# Patient Record
Sex: MALE | Race: WHITE
[De-identification: names, ages, dates, MRNs, and addresses within clinical notes are randomized per-mention and may not be internally consistent; named-entity substitution may affect disease eponyms.]

---

## 2017-01-03 ENCOUNTER — HOSPITAL ENCOUNTER (OUTPATIENT)
Dept: HOSPITAL 19 - COL.RAD | Age: 57
End: 2017-01-03
Attending: UROLOGY
Payer: COMMERCIAL

## 2017-01-03 DIAGNOSIS — Z90.5: ICD-10-CM

## 2017-01-03 DIAGNOSIS — N40.0: ICD-10-CM

## 2017-01-03 DIAGNOSIS — Z85.520: Primary | ICD-10-CM

## 2017-01-03 DIAGNOSIS — R39.198: ICD-10-CM

## 2017-06-28 ENCOUNTER — HOSPITAL ENCOUNTER (OUTPATIENT)
Dept: HOSPITAL 19 - COL.RAD | Age: 57
End: 2017-06-28
Attending: UROLOGY
Payer: COMMERCIAL

## 2017-06-28 DIAGNOSIS — Z85.528: Primary | ICD-10-CM

## 2017-06-28 DIAGNOSIS — Z90.5: ICD-10-CM

## 2017-12-28 ENCOUNTER — HOSPITAL ENCOUNTER (OUTPATIENT)
Dept: HOSPITAL 19 - COL.RAD | Age: 57
End: 2017-12-28
Attending: UROLOGY
Payer: COMMERCIAL

## 2017-12-28 DIAGNOSIS — Z85.520: Primary | ICD-10-CM

## 2019-01-02 ENCOUNTER — HOSPITAL ENCOUNTER (OUTPATIENT)
Dept: HOSPITAL 19 - COL.RAD | Age: 59
End: 2019-01-02
Attending: UROLOGY
Payer: COMMERCIAL

## 2019-01-02 DIAGNOSIS — C64.9: Primary | ICD-10-CM

## 2020-01-08 ENCOUNTER — HOSPITAL ENCOUNTER (OUTPATIENT)
Dept: HOSPITAL 19 - COL.RAD | Age: 60
End: 2020-01-08
Attending: UROLOGY
Payer: COMMERCIAL

## 2020-01-08 DIAGNOSIS — Z85.520: ICD-10-CM

## 2020-01-08 DIAGNOSIS — C64.2: Primary | ICD-10-CM

## 2020-08-21 ENCOUNTER — HOSPITAL ENCOUNTER (OUTPATIENT)
Dept: HOSPITAL 6 - RAD | Age: 60
End: 2020-08-21
Payer: COMMERCIAL

## 2020-08-21 DIAGNOSIS — K57.92: Primary | ICD-10-CM

## 2020-08-21 DIAGNOSIS — Z98.890: ICD-10-CM

## 2020-12-17 ENCOUNTER — HOSPITAL ENCOUNTER (EMERGENCY)
Dept: HOSPITAL 19 - COL.ER | Age: 60
Discharge: HOME | End: 2020-12-17
Attending: EMERGENCY MEDICINE
Payer: COMMERCIAL

## 2020-12-17 VITALS — TEMPERATURE: 98.7 F

## 2020-12-17 VITALS — HEART RATE: 70 BPM | SYSTOLIC BLOOD PRESSURE: 162 MMHG | DIASTOLIC BLOOD PRESSURE: 91 MMHG

## 2020-12-17 VITALS — HEIGHT: 72.01 IN | BODY MASS INDEX: 26.19 KG/M2 | WEIGHT: 193.35 LBS

## 2020-12-17 DIAGNOSIS — Z23: ICD-10-CM

## 2020-12-17 DIAGNOSIS — Z87.891: ICD-10-CM

## 2020-12-17 DIAGNOSIS — L04.2: Primary | ICD-10-CM

## 2020-12-17 DIAGNOSIS — Z85.528: ICD-10-CM

## 2021-09-08 ENCOUNTER — HOSPITAL ENCOUNTER (OUTPATIENT)
Dept: HOSPITAL 19 - COL.RAD | Age: 61
End: 2021-09-08
Attending: UROLOGY
Payer: COMMERCIAL

## 2021-09-08 DIAGNOSIS — C64.2: Primary | ICD-10-CM

## 2021-09-08 DIAGNOSIS — N40.0: ICD-10-CM

## 2021-09-16 ENCOUNTER — HOSPITAL ENCOUNTER (OUTPATIENT)
Dept: HOSPITAL 19 - COL.RAD | Age: 61
End: 2021-09-16
Attending: UROLOGY
Payer: COMMERCIAL

## 2021-09-16 DIAGNOSIS — Z85.520: Primary | ICD-10-CM

## 2021-12-13 ENCOUNTER — HOSPITAL ENCOUNTER (INPATIENT)
Dept: HOSPITAL 19 - INPTSU | Age: 61
LOS: 30 days | Discharge: HOME | DRG: 708 | End: 2022-01-12
Attending: UROLOGY | Admitting: UROLOGY
Payer: COMMERCIAL

## 2021-12-13 VITALS — BODY MASS INDEX: 26.23 KG/M2 | WEIGHT: 191.58 LBS | HEIGHT: 71.5 IN

## 2021-12-13 DIAGNOSIS — I10: ICD-10-CM

## 2021-12-13 DIAGNOSIS — C61: Primary | ICD-10-CM

## 2021-12-13 DIAGNOSIS — Z85.528: ICD-10-CM

## 2021-12-13 DIAGNOSIS — E78.5: ICD-10-CM

## 2021-12-13 DIAGNOSIS — Z90.5: ICD-10-CM

## 2021-12-13 PROCEDURE — A9284 NON-ELECTRONIC SPIROMETER: HCPCS

## 2021-12-13 PROCEDURE — A4314 CATH W/DRAINAGE 2-WAY LATEX: HCPCS

## 2022-01-11 VITALS — HEART RATE: 55 BPM | DIASTOLIC BLOOD PRESSURE: 62 MMHG | SYSTOLIC BLOOD PRESSURE: 124 MMHG

## 2022-01-11 VITALS — DIASTOLIC BLOOD PRESSURE: 79 MMHG | HEART RATE: 56 BPM | SYSTOLIC BLOOD PRESSURE: 133 MMHG

## 2022-01-11 VITALS — SYSTOLIC BLOOD PRESSURE: 124 MMHG | DIASTOLIC BLOOD PRESSURE: 68 MMHG | HEART RATE: 56 BPM

## 2022-01-11 VITALS — TEMPERATURE: 97.8 F | HEART RATE: 60 BPM | DIASTOLIC BLOOD PRESSURE: 72 MMHG | SYSTOLIC BLOOD PRESSURE: 117 MMHG

## 2022-01-11 VITALS — SYSTOLIC BLOOD PRESSURE: 123 MMHG | DIASTOLIC BLOOD PRESSURE: 66 MMHG | HEART RATE: 58 BPM

## 2022-01-11 VITALS — HEART RATE: 59 BPM | DIASTOLIC BLOOD PRESSURE: 72 MMHG | SYSTOLIC BLOOD PRESSURE: 114 MMHG | TEMPERATURE: 98.3 F

## 2022-01-11 VITALS — HEART RATE: 61 BPM | DIASTOLIC BLOOD PRESSURE: 72 MMHG | SYSTOLIC BLOOD PRESSURE: 117 MMHG

## 2022-01-11 VITALS — HEART RATE: 57 BPM | SYSTOLIC BLOOD PRESSURE: 118 MMHG | DIASTOLIC BLOOD PRESSURE: 62 MMHG

## 2022-01-11 VITALS — DIASTOLIC BLOOD PRESSURE: 62 MMHG | HEART RATE: 62 BPM | SYSTOLIC BLOOD PRESSURE: 118 MMHG

## 2022-01-11 PROCEDURE — 0VTQ4ZZ RESECTION OF BILATERAL VAS DEFERENS, PERCUTANEOUS ENDOSCOPIC APPROACH: ICD-10-PCS | Performed by: UROLOGY

## 2022-01-11 PROCEDURE — 8E0W4CZ ROBOTIC ASSISTED PROCEDURE OF TRUNK REGION, PERCUTANEOUS ENDOSCOPIC APPROACH: ICD-10-PCS | Performed by: UROLOGY

## 2022-01-11 PROCEDURE — 0VT04ZZ RESECTION OF PROSTATE, PERCUTANEOUS ENDOSCOPIC APPROACH: ICD-10-PCS | Performed by: UROLOGY

## 2022-01-11 PROCEDURE — 0VT34ZZ RESECTION OF BILATERAL SEMINAL VESICLES, PERCUTANEOUS ENDOSCOPIC APPROACH: ICD-10-PCS | Performed by: UROLOGY

## 2022-01-11 NOTE — NUR
Pt arrived to the floor following procedure, he is A/O x4. His breathing is
even and unlabored on RA. Pt denies SOB. Lungs CTA. HRR. Pt denies any pain or
N/T. Five sites to abdomen CDI. Sivakumar drain in place to L abdomen. Some
drainage present, marked. SCD's in place to bilateral legs. Farris DD, dark
urine present. IVF infusing into LFA without issues. IS at bedside. No needs
at this time. Call light within reach.

## 2022-01-11 NOTE — NUR
O2 OFF AT THIS TIME. ASSISTED PT TO CHAIR. FELICITY WELL. BHAGAT DRAINING DARK RED
URINE. SID DRAIN INTACT. BLEEDING AT SITE. REINFORCED DRSG. CALL LIGHT IN
REACH.

## 2022-01-11 NOTE — NUR
The patient ambulated back to [facility] independently using a steady gait and
appeared to tolerate the activity well. Vital signs obtained. Consent signed.
Assessment completed. 18G IV started in left forearm with one stick, LR
Infusing without difficulty. Wife brought back to be at his bedside. Warm
blankets provided. Call light is within reach. The patient denies any further
needs at this time. Will continue to monitor the patient.

## 2022-01-12 VITALS — TEMPERATURE: 97.6 F | SYSTOLIC BLOOD PRESSURE: 127 MMHG | HEART RATE: 54 BPM | DIASTOLIC BLOOD PRESSURE: 71 MMHG

## 2022-01-12 VITALS — TEMPERATURE: 98.4 F | HEART RATE: 63 BPM | DIASTOLIC BLOOD PRESSURE: 63 MMHG | SYSTOLIC BLOOD PRESSURE: 106 MMHG

## 2022-01-12 VITALS — DIASTOLIC BLOOD PRESSURE: 64 MMHG | HEART RATE: 50 BPM | SYSTOLIC BLOOD PRESSURE: 109 MMHG

## 2022-01-12 VITALS — DIASTOLIC BLOOD PRESSURE: 47 MMHG | TEMPERATURE: 98.1 F | SYSTOLIC BLOOD PRESSURE: 108 MMHG | HEART RATE: 53 BPM

## 2022-01-12 LAB
ANION GAP SERPL CALC-SCNC: 12 MMOL/L (ref 7–16)
BUN SERPL-MCNC: 18 MG/DL (ref 8–26)
CALCIUM SERPL-MCNC: 9.2 MG/DL (ref 8.4–10.2)
CHLORIDE SERPL-SCNC: 103 MMOL/L (ref 98–107)
CO2 SERPL-SCNC: 22 MMOL/L (ref 23–31)
CREAT SERPL-SCNC: 1.2 MG/DL (ref 0.72–1.25)
GLUCOSE SERPL-MCNC: 121 MG/DL (ref 70–99)
HCT VFR BLD AUTO: 36.3 % (ref 42–52)
HGB BLD-MCNC: 11.8 G/DL (ref 13.5–18)
POTASSIUM SERPL-SCNC: 5.2 MMOL/L (ref 3.5–4.5)
SODIUM SERPL-SCNC: 137 MMOL/L (ref 136–145)

## 2022-01-12 NOTE — NUR
Reviewed discharge instructions with pt and his wife.  Reviewed catheter care
as well as leg bag teaching.  Drain removed educated to leave dressing in
place until tomorrow and then okay to shower.  Catheter supplies sent home
with pt.  INT removed from left forearm.  Informed to notify nursing when he
is ready to go

## 2022-01-12 NOTE — NUR
PT HAS BEEN RESTING WELL. BHAGAT TO DD WITH MORE YELLOW URINE NOW THAN RED.
SID DRAIN HAS NO MORE BLEEDING AT SITE.

## 2022-01-12 NOTE — NUR
Pt doing well this morning.  He has been up walking in his room.  Reported
that pain is a little worse since he has been up more, but that it is still
tolerable.  He has ordered his breakfast.  No other needs verbalized, call
light within reach

## 2022-01-12 NOTE — NUR
Pt continues to do well with minimal pain complaints.  Pt is passing gas.
Scheduled medications given per order.  No other needs verbalized, call light
within reach

## 2022-01-12 NOTE — NUR
met with patient to discuss discharge planning. Patient lives in
[place] with his wife, Vy (ph#[phone]) and sees Dr. Mantilla for
primary care. Patient obtains medications from [facility] with no
difficulties. 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 Patient does not
use any DME and reports independence with ADLS. Patient advised his wife is
his DPOA-HC and that he has two children, Ricky and Miguel. Patient advised he
plans to return home upon discharge.

## 2022-01-12 NOTE — NUR
Pt tolerated breakfast with no issues.  Pt staying very active and is always
up walking in his room.  Educated he could walk in the halls, just would need
a mask.  Pt stated that his pain is tolerable, up to a 3/10 with movement.
Holding BP med at this time per his request.  Will recheck

## 2023-10-31 ENCOUNTER — HOSPITAL ENCOUNTER (OUTPATIENT)
Dept: HOSPITAL 19 - COL.RAD | Age: 63
End: 2023-10-31
Payer: COMMERCIAL

## 2023-10-31 DIAGNOSIS — N50.3: Primary | ICD-10-CM
